# Patient Record
Sex: MALE | Race: OTHER | URBAN - METROPOLITAN AREA
[De-identification: names, ages, dates, MRNs, and addresses within clinical notes are randomized per-mention and may not be internally consistent; named-entity substitution may affect disease eponyms.]

---

## 2017-02-28 NOTE — PATIENT DISCUSSION
DERMATOCHALASIS (UPPER LIDS), OU:  VISUALLY SIGNIFICANT TO PATIENT. REFER TO OCULOPLASTIC SPECIALIST,  _CANDICE____.

## 2017-02-28 NOTE — PATIENT DISCUSSION
Dermatochalasis Counseling:  I have explained the diagnosis of dermatochalasis to the patient. The resulting symptoms, including but not limited to visual field deficits, blepharitis, and/or dry eye symptoms from eyelid wick syndrome that may result if left untreated were discussed with the patient. The patient understands that intervention is elective surgery and consultation with an oculoplastic specialist can be scheduled at their convenience.

## 2017-05-31 NOTE — PATIENT DISCUSSION
PHOTOGRAPHS: I have reviewed the external ocular photographs of this patient which show the following: significant dermatochalasis both upper eyelids and severe brow ptosis bilateral.

## 2017-05-31 NOTE — PATIENT DISCUSSION
[place] Visual Field 36 point screen: I have reviewed the visual fields both taped and untaped on this patient which demonstrate significant obstruction of the patient's peripheral visual field on both eyes.

## 2017-05-31 NOTE — PATIENT DISCUSSION
Eyebrow Ptosis Counseling:  I have examined the patient and reviewed the photos and visual fields. The brow position has fallen below the orbital rim. This causes impairment of the peripheral visual field which improves with taping. I have discussed with the patient the option of a brow lift to elevate the brow to a more normal anatomic position and improve the functional visual field loss. We have discussed the risks and benefits of the surgery at length as well as the location of the incision and the recovery process. The patient understands the surgery , has had all questions addressed and desires to proceed with the surgery as explained.

## 2020-02-26 NOTE — PATIENT DISCUSSION
Also, please do not hesitate to call us if you have any concerns not addressed by this information. Please call [phone] and we will do everything we can to help you during this period.

## 2020-10-07 NOTE — PATIENT DISCUSSION
HORDEOLUM INTERNUM O_: PATIENT EDUCATION RX WARM COMPRESSES 4-6 TIMES A DAY WITH DIGITAL MASSAGE. RX EMYCIN KAR TO AFFECTED AREA BID.

## 2020-10-07 NOTE — PATIENT DISCUSSION
New Prescription: Maxitrol (neomycin-polymyxin-dexameth): ointment: 3.5-10,000-0.1 mg-unit/g-% a small amount twice a day into affected eye 10-

## 2020-10-20 NOTE — PATIENT DISCUSSION
Continue: Maxitrol (neomycin-polymyxin-dexameth): ointment: 3.5-10,000-0.1 mg-unit/g-% a small amount twice a day into affected eye 10-

## 2020-10-20 NOTE — PATIENT DISCUSSION
HORDEOLUM INTERNUM OD: PATIENT EDUCATION RX WARM COMPRESSES 4-6 TIMES A DAY WITH DIGITAL MASSAGE. RX EMYCIN KAR TO AFFECTED AREA BID.

## 2022-10-06 ENCOUNTER — COMPREHENSIVE EXAM (OUTPATIENT)
Dept: URBAN - METROPOLITAN AREA CLINIC 20 | Facility: CLINIC | Age: 77
End: 2022-10-06

## 2022-10-06 DIAGNOSIS — H35.371: ICD-10-CM

## 2022-10-06 DIAGNOSIS — H52.4: ICD-10-CM

## 2022-10-06 DIAGNOSIS — H25.9: ICD-10-CM

## 2022-10-06 DIAGNOSIS — E11.9: ICD-10-CM

## 2022-10-06 PROCEDURE — 92014 COMPRE OPH EXAM EST PT 1/>: CPT

## 2022-10-06 PROCEDURE — 92015 DETERMINE REFRACTIVE STATE: CPT

## 2022-10-06 PROCEDURE — 92250 FUNDUS PHOTOGRAPHY W/I&R: CPT

## 2022-10-06 ASSESSMENT — KERATOMETRY
OS_AXISANGLE2_DEGREES: 90
OS_AXISANGLE_DEGREES: 0
OD_AXISANGLE_DEGREES: 0
OD_K1POWER_DIOPTERS: 45.50
OS_K1POWER_DIOPTERS: 45.75
OD_AXISANGLE2_DEGREES: 90
OS_K2POWER_DIOPTERS: 45.75
OD_K2POWER_DIOPTERS: 45.50

## 2022-10-06 ASSESSMENT — VISUAL ACUITY
OS_SC: 20/50
OU_SC: 20/200
OD_SC: 20/40

## 2022-10-06 ASSESSMENT — TONOMETRY
OD_IOP_MMHG: 8
OS_IOP_MMHG: 8
